# Patient Record
Sex: FEMALE | Race: WHITE | NOT HISPANIC OR LATINO | Employment: OTHER | URBAN - METROPOLITAN AREA
[De-identification: names, ages, dates, MRNs, and addresses within clinical notes are randomized per-mention and may not be internally consistent; named-entity substitution may affect disease eponyms.]

---

## 2018-05-19 ENCOUNTER — HOSPITAL ENCOUNTER (EMERGENCY)
Facility: HOSPITAL | Age: 33
Discharge: HOME | End: 2018-05-19
Attending: EMERGENCY MEDICINE
Payer: MEDICARE

## 2018-05-19 ENCOUNTER — APPOINTMENT (EMERGENCY)
Dept: RADIOLOGY | Facility: HOSPITAL | Age: 33
End: 2018-05-19
Payer: MEDICARE

## 2018-05-19 VITALS
TEMPERATURE: 97.9 F | OXYGEN SATURATION: 100 % | WEIGHT: 152.56 LBS | RESPIRATION RATE: 16 BRPM | DIASTOLIC BLOOD PRESSURE: 64 MMHG | HEART RATE: 109 BPM | SYSTOLIC BLOOD PRESSURE: 120 MMHG

## 2018-05-19 DIAGNOSIS — S93.492A SPRAIN OF ANTERIOR TALOFIBULAR LIGAMENT OF LEFT ANKLE, INITIAL ENCOUNTER: Primary | ICD-10-CM

## 2018-05-19 DIAGNOSIS — S90.512A ABRASION OF LEFT ANKLE, INITIAL ENCOUNTER: ICD-10-CM

## 2018-05-19 PROCEDURE — 73630 X-RAY EXAM OF FOOT: CPT

## 2018-05-19 PROCEDURE — 99284 EMERGENCY DEPT VISIT MOD MDM: CPT

## 2018-05-19 PROCEDURE — 73590 X-RAY EXAM OF LOWER LEG: CPT

## 2018-05-19 PROCEDURE — 73600 X-RAY EXAM OF ANKLE: CPT

## 2018-05-19 RX ORDER — GINSENG 100 MG
1 CAPSULE ORAL ONCE
Status: COMPLETED | OUTPATIENT
Start: 2018-05-19 | End: 2018-05-19

## 2018-05-19 RX ADMIN — BACITRACIN ZINC 1 SMALL APPLICATION: 500 OINTMENT TOPICAL at 18:22

## 2018-05-19 NOTE — DISCHARGE INSTRUCTIONS
Diagnosis; left ankle injury - likely lateral left ankle sprain - over stretching of ligaments--     - usually will heal on its own over time    - for pain- over the counter tylenol 500 mg every 4 hrs     - ace wrap as needed for support- comfort    - ambulation as tolerated- might require more help ambulating until ankle heals    - for abrasion -- wash daily  With soap and water -- for next 3-5 days after waSH CAN  APPLY TOPICAL OVER THE COUNTER GENERIC ANTIBIOTIC OINTMENT TO ABRASION LIGHTLY - AND COVER WITH BAND AID    - PLEASE RETURN TO  THE ER FOR ANY SIGNS OF WOUND INFECTION OF ANKLE ABRASION- SPREADING REDNESS/WARMTH/SWELLING/     - IF  STILL HAVING PROBLEMS WITH LEFT ANKLE AFTER 1 WEEK - YOU CAN CALL THE ORTHOPEDIC DOCTOR TO SCHEDULE AN APPOINTMENT- YOU CAN SEE ANY DOCTOR IN THE GROUP

## 2018-05-19 NOTE — ED PROVIDER NOTES
History  Chief Complaint   Patient presents with    Ankle Injury     Pt presents to ED via EMS from home after losing balance while mother was changing pt's diaper causing injury to left ankle  Pt (+) non verbal, MR        26 yr female with mr/cp- non verbal- trying to stand  To help get undressed  As per sister with twisting injury to left ankle area- no other comps or injuries as per sister        History provided by:  Relative  History limited by:  Patient nonverbal  Ankle Injury   Associated symptoms: no rash        None       Past Medical History:   Diagnosis Date    CP (cerebral palsy) ([facility] [address] )     MR (mental retardation)        History reviewed  No pertinent surgical history  History reviewed  No pertinent family history  I have reviewed and agree with the history as documented  Social History   Substance Use Topics    Smoking status: Never Smoker    Smokeless tobacco: Never Used    Alcohol use No        Review of Systems   Constitutional: Negative  HENT: Negative  Eyes: Negative  Respiratory: Negative  Cardiovascular: Negative  Gastrointestinal: Negative  Endocrine: Negative  Genitourinary: Negative  Musculoskeletal: Negative  Left lateral ankle injury   Skin: Positive for wound  Negative for color change, pallor and rash  Allergic/Immunologic: Negative  Neurological: Negative  Hematological: Negative  Psychiatric/Behavioral: Negative  Physical Exam  Physical Exam   Constitutional: She appears well-developed and well-nourished  No distress  avss-- tachy-- pulse ox 100 % on ra- interpretation is normal- no intervention    Musculoskeletal:   lle- nt at hip/knee- - pos left ankle- lateral malleolar small abrasion - no deformity - foot- nt- normal distal pulse-sensation cap refill- nt at left achilles tendon   Skin: She is not diaphoretic  Nursing note and vitals reviewed        Vital Signs  ED Triage Vitals   Temperature Pulse Respirations Blood Pressure SpO2   05/19/18 1806 05/19/18 1804 05/19/18 1804 05/19/18 1804 05/19/18 1804   97 9 °F (36 6 °C) (!) 109 16 120/64 100 %      Temp Source Heart Rate Source Patient Position - Orthostatic VS BP Location FiO2 (%)   05/19/18 1806 -- 05/19/18 1804 05/19/18 1804 --   Axillary  Sitting Right arm       Pain Score       --                  Vitals:    05/19/18 1804   BP: 120/64   Pulse: (!) 109   Patient Position - Orthostatic VS: Sitting       Visual Acuity      ED Medications  Medications   bacitracin topical ointment 1 small application (1 small application Topical Given 5/19/18 1822)       Diagnostic Studies  Results Reviewed     None                 XR foot 3+ views LEFT    (Results Pending)   XR tibia fibula 2 views LEFT    (Results Pending)   XR ankle 2 vw left    (Results Pending)              Procedures  Procedures       Phone Contacts  ED Phone Contact    ED Course  ED Course as of May 19 1916   Sat May 19, 2018   1915 Left tib/fib/ left ankle/left foot- no fx                                 MDM  CritCare Time    Disposition  Final diagnoses:   None     ED Disposition     None      Follow-up Information    None         Patient's Medications    No medications on file     No discharge procedures on file      ED Provider  Electronically Signed by           Mulu Negrete MD  05/19/18 9292